# Patient Record
Sex: MALE | Race: BLACK OR AFRICAN AMERICAN | NOT HISPANIC OR LATINO | Employment: OTHER | URBAN - METROPOLITAN AREA
[De-identification: names, ages, dates, MRNs, and addresses within clinical notes are randomized per-mention and may not be internally consistent; named-entity substitution may affect disease eponyms.]

---

## 2023-02-20 ENCOUNTER — HOSPITAL ENCOUNTER (EMERGENCY)
Facility: HOSPITAL | Age: 88
Discharge: HOME | End: 2023-02-21
Attending: EMERGENCY MEDICINE | Admitting: EMERGENCY MEDICINE

## 2023-02-20 DIAGNOSIS — N39.0 UTI (URINARY TRACT INFECTION): ICD-10-CM

## 2023-02-20 DIAGNOSIS — I83.009 VENOUS STASIS ULCER (HCC): ICD-10-CM

## 2023-02-20 DIAGNOSIS — N28.9 RENAL INSUFFICIENCY: ICD-10-CM

## 2023-02-20 DIAGNOSIS — L97.909 VENOUS STASIS ULCER (HCC): ICD-10-CM

## 2023-02-20 DIAGNOSIS — Z51.89 VISIT FOR WOUND CHECK: Primary | ICD-10-CM

## 2023-02-21 VITALS
BODY MASS INDEX: 22.16 KG/M2 | RESPIRATION RATE: 18 BRPM | WEIGHT: 154.76 LBS | HEART RATE: 61 BPM | HEIGHT: 70 IN | TEMPERATURE: 97.6 F | DIASTOLIC BLOOD PRESSURE: 72 MMHG | SYSTOLIC BLOOD PRESSURE: 149 MMHG | OXYGEN SATURATION: 99 %

## 2023-02-21 LAB
ANION GAP SERPL CALCULATED.3IONS-SCNC: 6 MMOL/L (ref 4–13)
BACTERIA UR QL AUTO: ABNORMAL /HPF
BASOPHILS # BLD AUTO: 0.02 THOUSANDS/ÂΜL (ref 0–0.1)
BASOPHILS NFR BLD AUTO: 0 % (ref 0–1)
BILIRUB UR QL STRIP: NEGATIVE
BUN SERPL-MCNC: 27 MG/DL (ref 5–25)
CALCIUM SERPL-MCNC: 8.5 MG/DL (ref 8.4–10.2)
CHLORIDE SERPL-SCNC: 104 MMOL/L (ref 96–108)
CLARITY UR: ABNORMAL
CO2 SERPL-SCNC: 28 MMOL/L (ref 21–32)
COLOR UR: YELLOW
CREAT SERPL-MCNC: 1.61 MG/DL (ref 0.6–1.3)
CRYSTALS URNS QL MICRO: ABNORMAL /HPF
EOSINOPHIL # BLD AUTO: 0.02 THOUSAND/ÂΜL (ref 0–0.61)
EOSINOPHIL NFR BLD AUTO: 0 % (ref 0–6)
ERYTHROCYTE [DISTWIDTH] IN BLOOD BY AUTOMATED COUNT: 13.8 % (ref 11.6–15.1)
GFR SERPL CREATININE-BSD FRML MDRD: 37 ML/MIN/1.73SQ M
GLUCOSE SERPL-MCNC: 92 MG/DL (ref 65–140)
GLUCOSE UR STRIP-MCNC: NEGATIVE MG/DL
HCT VFR BLD AUTO: 36.7 % (ref 36.5–49.3)
HGB BLD-MCNC: 11.4 G/DL (ref 12–17)
HGB UR QL STRIP.AUTO: ABNORMAL
IMM GRANULOCYTES # BLD AUTO: 0.01 THOUSAND/UL (ref 0–0.2)
IMM GRANULOCYTES NFR BLD AUTO: 0 % (ref 0–2)
KETONES UR STRIP-MCNC: NEGATIVE MG/DL
LEUKOCYTE ESTERASE UR QL STRIP: NEGATIVE
LYMPHOCYTES # BLD AUTO: 1.69 THOUSANDS/ÂΜL (ref 0.6–4.47)
LYMPHOCYTES NFR BLD AUTO: 25 % (ref 14–44)
MAGNESIUM SERPL-MCNC: 1.9 MG/DL (ref 1.9–2.7)
MCH RBC QN AUTO: 27.7 PG (ref 26.8–34.3)
MCHC RBC AUTO-ENTMCNC: 31.1 G/DL (ref 31.4–37.4)
MCV RBC AUTO: 89 FL (ref 82–98)
MONOCYTES # BLD AUTO: 0.56 THOUSAND/ÂΜL (ref 0.17–1.22)
MONOCYTES NFR BLD AUTO: 8 % (ref 4–12)
NEUTROPHILS # BLD AUTO: 4.38 THOUSANDS/ÂΜL (ref 1.85–7.62)
NEUTS SEG NFR BLD AUTO: 67 % (ref 43–75)
NITRITE UR QL STRIP: POSITIVE
NON-SQ EPI CELLS URNS QL MICRO: ABNORMAL /HPF
NRBC BLD AUTO-RTO: 0 /100 WBCS
PH UR STRIP.AUTO: 6 [PH]
PLATELET # BLD AUTO: 212 THOUSANDS/UL (ref 149–390)
PMV BLD AUTO: 11.6 FL (ref 8.9–12.7)
POTASSIUM SERPL-SCNC: 5.3 MMOL/L (ref 3.5–5.3)
PROT UR STRIP-MCNC: NEGATIVE MG/DL
RBC # BLD AUTO: 4.11 MILLION/UL (ref 3.88–5.62)
RBC #/AREA URNS AUTO: ABNORMAL /HPF
SODIUM SERPL-SCNC: 138 MMOL/L (ref 135–147)
SP GR UR STRIP.AUTO: 1.01 (ref 1–1.03)
UROBILINOGEN UR QL STRIP.AUTO: 0.2 E.U./DL
WBC # BLD AUTO: 6.68 THOUSAND/UL (ref 4.31–10.16)
WBC #/AREA URNS AUTO: ABNORMAL /HPF

## 2023-02-21 RX ORDER — CEPHALEXIN 500 MG/1
500 CAPSULE ORAL EVERY 8 HOURS SCHEDULED
Qty: 21 CAPSULE | Refills: 0 | Status: SHIPPED | OUTPATIENT
Start: 2023-02-21 | End: 2023-02-28

## 2023-02-21 RX ORDER — CEFTRIAXONE 1 G/50ML
1000 INJECTION, SOLUTION INTRAVENOUS ONCE
Status: COMPLETED | OUTPATIENT
Start: 2023-02-21 | End: 2023-02-21

## 2023-02-21 RX ORDER — GINSENG 100 MG
1 CAPSULE ORAL ONCE
Status: COMPLETED | OUTPATIENT
Start: 2023-02-21 | End: 2023-02-21

## 2023-02-21 RX ADMIN — CEFTRIAXONE 1000 MG: 1 INJECTION, SOLUTION INTRAVENOUS at 00:58

## 2023-02-21 RX ADMIN — BACITRACIN ZINC 1 LARGE APPLICATION: 500 OINTMENT TOPICAL at 01:34

## 2023-02-21 NOTE — ED PROVIDER NOTES
History  Chief Complaint   Patient presents with   • Wound Check     Pt arrives with left leg wound starting 1/10, seen at another hospital this week and did not have much treatment, concerned about weeping and swelling in feet  Pt afebrile at time of triage  PTA pt appears to have drive an hour out of the way after dropping off his nephew today, had to be tracked by his nephew  25-year-old male with past history of hypertension, CAD status post CABG x5, BPH, CHF, chronic venous stasis ulcer to left lower extremity, presents to the ED for evaluation of left leg wound  Patient is from [facility] today patient went out to drop his nephew to a hotel  On his way back patient got lost and could not find his way back  Patient was driving for about 4 to 5 hours  His nephew was able to track patient as nephew had left his cell phone in patient's car  Patient was able to find a police station to ask for help  Nephew went to get patient from the police station  At the police station they noticed patient's wound on the left leg and called EMS prior to patient's nephew's arrival   Patient subsequently brought to the ED for further evaluation  Patient is alert and oriented x3 in the ED  Patient states that he had noted this wound few months ago  Patient denies any fevers or chills  Patient denies any excessive pain  Patient denies any warmth to touch  Patient was seen at an emergency department 2 days ago for evaluation of this wound  Patient was placed on p o  antibiotic and discharged home with follow-up to wound care as well as PCP  Patient has an appointment with his PCP in 2 days  Patient never filled the prescription for the antibiotic  Patient denies any other injuries or falls  States that he has chronic swelling to his bilateral lower extremities  Patient is on a water pill        History provided by:  Patient, EMS personnel and relative (Nephew)  Wound Check         None Past Medical History:   Diagnosis Date   • Coronary artery disease    • Enlarged prostate    • Hypertension        Past Surgical History:   Procedure Laterality Date   • CARDIAC SURGERY      5 blockages, unsure of procedure, done in 2020  History reviewed  No pertinent family history  I have reviewed and agree with the history as documented  E-Cigarette/Vaping   • E-Cigarette Use Never User      E-Cigarette/Vaping Substances     Social History     Tobacco Use   • Smoking status: Never   • Smokeless tobacco: Never   Vaping Use   • Vaping Use: Never used   Substance Use Topics   • Alcohol use: Not Currently   • Drug use: Not Currently       Review of Systems   Constitutional: Negative for activity change, fatigue and fever  HENT: Negative for congestion, ear discharge and sore throat  Eyes: Negative for pain and redness  Respiratory: Negative for cough, chest tightness, shortness of breath and wheezing  Cardiovascular: Negative for chest pain  Gastrointestinal: Negative for abdominal pain, diarrhea, nausea and vomiting  Endocrine: Negative for cold intolerance  Genitourinary: Negative for dysuria and urgency  Musculoskeletal: Negative for arthralgias and back pain  Skin: Positive for wound  Neurological: Negative for dizziness, weakness and headaches  Psychiatric/Behavioral: Negative for agitation and behavioral problems  Physical Exam  Physical Exam  Vitals and nursing note reviewed  Constitutional:       General: He is not in acute distress  Appearance: He is well-developed  HENT:      Head: Normocephalic and atraumatic  Eyes:      Conjunctiva/sclera: Conjunctivae normal    Cardiovascular:      Rate and Rhythm: Normal rate and regular rhythm  Heart sounds: No murmur heard  Pulmonary:      Effort: Pulmonary effort is normal  No respiratory distress  Breath sounds: Normal breath sounds  Abdominal:      Palpations: Abdomen is soft        Tenderness: There is no abdominal tenderness  Musculoskeletal:         General: No swelling  Cervical back: Neck supple  Comments: +2 pitting edema noted to bilateral lower extremities that patient states is normal for him  Pulses intact to bilateral lower extremities by Doppler  4 cm area of circular ulcerated wound with dry scaling noted surrounding the ulcer to the anterior left shin  Wound is not warm to touch  Wound appears to be chronic  Please see picture below for clarification  Skin:     General: Skin is warm and dry  Capillary Refill: Capillary refill takes less than 2 seconds  Neurological:      Mental Status: He is alert     Psychiatric:         Mood and Affect: Mood normal              Vital Signs  ED Triage Vitals [02/20/23 2318]   Temperature Pulse Respirations Blood Pressure SpO2   97 5 °F (36 4 °C) (!) 52 18 166/74 98 %      Temp Source Heart Rate Source Patient Position - Orthostatic VS BP Location FiO2 (%)   Oral Monitor Sitting Left arm --      Pain Score       --           Vitals:    02/20/23 2318 02/21/23 0136   BP: 166/74 149/72   Pulse: (!) 52 61   Patient Position - Orthostatic VS: Sitting Lying         Visual Acuity      ED Medications  Medications   cefTRIAXone (ROCEPHIN) IVPB (premix in dextrose) 1,000 mg 50 mL (0 mg Intravenous Stopped 2/21/23 0130)   bacitracin topical ointment 1 large application (1 large application Topical Given 2/21/23 0134)       Diagnostic Studies  Results Reviewed     Procedure Component Value Units Date/Time    Urine Microscopic [[id number]]  (Abnormal) Collected: 02/21/23 0019    Lab Status: Final result Specimen: Urine, Clean Catch Updated: 02/21/23 0110     RBC, UA 1-2 /hpf      WBC, UA 1-2 /hpf      Epithelial Cells None Seen /hpf      Bacteria, UA Moderate /hpf      OTHER CRYSTALS Ammonium Urate /hpf     Basic metabolic panel [[id number]]  (Abnormal) Collected: 02/20/23 2355    Lab Status: Final result Specimen: Blood from Arm, Left Updated: 02/21/23 0058     Sodium 138 mmol/L      Potassium 5 3 mmol/L      Chloride 104 mmol/L      CO2 28 mmol/L      ANION GAP 6 mmol/L      BUN 27 mg/dL      Creatinine 1 61 mg/dL      Glucose 92 mg/dL      Calcium 8 5 mg/dL      eGFR 37 ml/min/1 73sq m     Narrative:      [place] guidelines for Chronic Kidney Disease (CKD):   •  Stage 1 with normal or high GFR (GFR > 90 mL/min/1 73 square meters)  •  Stage 2 Mild CKD (GFR = 60-89 mL/min/1 73 square meters)  •  Stage 3A Moderate CKD (GFR = 45-59 mL/min/1 73 square meters)  •  Stage 3B Moderate CKD (GFR = 30-44 mL/min/1 73 square meters)  •  Stage 4 Severe CKD (GFR = 15-29 mL/min/1 73 square meters)  •  Stage 5 End Stage CKD (GFR <15 mL/min/1 73 square meters)  Note: GFR calculation is accurate only with a steady state creatinine    Magnesium [[id number]]  (Normal) Collected: 02/20/23 2355    Lab Status: Final result Specimen: Blood from Arm, Left Updated: 02/21/23 0058     Magnesium 1 9 mg/dL     UA w Reflex to Microscopic w Reflex to Culture [[id number]]  (Abnormal) Collected: 02/21/23 0019    Lab Status: Final result Specimen: Urine, Clean Catch Updated: 02/21/23 0040     Color, UA Yellow     Clarity, UA Slightly Cloudy     Specific Gravity, UA 1 015     pH, UA 6 0     Leukocytes, UA Negative     Nitrite, UA Positive     Protein, UA Negative mg/dl      Glucose, UA Negative mg/dl      Ketones, UA Negative mg/dl      Urobilinogen, UA 0 2 E U /dl      Bilirubin, UA Negative     Occult Blood, UA Trace-Intact    CBC and differential [[id number]]  (Abnormal) Collected: 02/20/23 2355    Lab Status: Final result Specimen: Blood from Arm, Left Updated: 02/21/23 0028     WBC 6 68 Thousand/uL      RBC 4 11 Million/uL      Hemoglobin 11 4 g/dL      Hematocrit 36 7 %      MCV 89 fL      MCH 27 7 pg      MCHC 31 1 g/dL      RDW 13 8 %      MPV 11 6 fL      Platelets 638 Thousands/uL      nRBC 0 /100 WBCs      Neutrophils Relative 67 %      Immat GRANS % 0 % Lymphocytes Relative 25 %      Monocytes Relative 8 %      Eosinophils Relative 0 %      Basophils Relative 0 %      Neutrophils Absolute 4 38 Thousands/µL      Immature Grans Absolute 0 01 Thousand/uL      Lymphocytes Absolute 1 69 Thousands/µL      Monocytes Absolute 0 56 Thousand/µL      Eosinophils Absolute 0 02 Thousand/µL      Basophils Absolute 0 02 Thousands/µL                  No orders to display              Procedures  Procedures         ED Course                               SBIRT 20yo+    Flowsheet Row Most Recent Value   SBIRT (23 yo +)    In order to provide better care to our patients, we are screening all of our patients for alcohol and drug use  Would it be okay to ask you these screening questions? No Filed at: 02/20/2023 2571                    Medical Decision Making  Obtain blood work, UA  Give antibiotics and dress wound    Since physical exam today is consistent with chronic venous stasis ulcer  Wound was dressed with bacitracin and ABD bandage in the ED  Patient's UA showed concern for UTI  Patient placed on Keflex for UTI as well as patient's venous stasis wound  CBC was essentially unremarkable  BMP showed elevated BUN and creatinine level  I do not have any baseline values to compare with as patient is out of network  Patient does have an appointment with his PCP in 2 days  At this time patient is discharged home with mupirocin ointment for his wound as well as Keflex for his wound and UTI  Patient given copy of his lab work today to take to his PCP for follow-up for his renal insufficiency  I explained everything to patient's nephew who will follow up with patient's PCP appointment  Patient told to continue all of his other home medications until he sees his PCP  Close return instructions given to return to the ER for any worsening symptoms  Patient agrees with discharge plan  Patient well appearing at time of discharge      Please Note: Fluency Direct voice recognition software may have been used in the creation of this document  Wrong words or sound a like substitutions may have occurred due to the inherent limitations of the voice software  Renal insufficiency: acute illness or injury  UTI (urinary tract infection): acute illness or injury  Venous stasis ulcer ([facility] [address] ): acute illness or injury  Visit for wound check: acute illness or injury  Amount and/or Complexity of Data Reviewed  Labs: ordered  Risk  OTC drugs  Prescription drug management  Disposition  Final diagnoses:   Visit for wound check   Venous stasis ulcer ([facility] )   Renal insufficiency   UTI (urinary tract infection)     Time reflects when diagnosis was documented in both MDM as applicable and the Disposition within this note     Time User Action Codes Description Comment    2/21/2023  1:26 AM Evelin Dach Add [Z51 89] Visit for wound check     2/21/2023  1:26 AM Evelin Dach Add [I83 009,  K17 [id number]] Venous stasis ulcer ([facility] )     2/21/2023  1:26 AM Evelin Dach Add [N28 9] Renal insufficiency     2/21/2023  1:26 AM Evelin Dach Add [N39 0] UTI (urinary tract infection)       ED Disposition     ED Disposition   Discharge    Condition   Stable    Date/Time   Tue Feb 21, 2023  1:27 AM    Comment   Balta Pedersen discharge to home/self care  Follow-up Information     Follow up With Specialties Details Why Contact Info    Your Family Doctor  Go in 2 days As scheduled  Kidney function was elevated today  Please discuss this finding with your family doctor during your next appointment             Discharge Medication List as of 2/21/2023  1:34 AM      START taking these medications    Details   cephalexin (KEFLEX) 500 mg capsule Take 1 capsule (500 mg total) by mouth every 8 (eight) hours for 7 days, Starting Tue 2/21/2023, Until Tue 2/28/2023, Print      mupirocin (BACTROBAN) 2 % ointment Apply topically 3 (three) times a day for 7 days, Starting Tue 2/21/2023, Until Tue 2/28/2023, Print             No discharge procedures on file      PDMP Review     None          ED Provider  Electronically Signed by           Elenora Saint, DO  02/21/23 0149